# Patient Record
Sex: FEMALE | Race: WHITE | Employment: FULL TIME | URBAN - METROPOLITAN AREA
[De-identification: names, ages, dates, MRNs, and addresses within clinical notes are randomized per-mention and may not be internally consistent; named-entity substitution may affect disease eponyms.]

---

## 2018-05-31 ENCOUNTER — HOSPITAL ENCOUNTER (OUTPATIENT)
Dept: ULTRASOUND IMAGING | Age: 63
Discharge: HOME | End: 2018-05-31
Attending: INTERNAL MEDICINE
Payer: COMMERCIAL

## 2018-05-31 ENCOUNTER — HOSPITAL ENCOUNTER (OUTPATIENT)
Dept: MAMMOGRAPHY | Age: 63
Discharge: HOME | End: 2018-05-31
Attending: INTERNAL MEDICINE
Payer: COMMERCIAL

## 2018-05-31 DIAGNOSIS — N63.10 BREAST MASS, RIGHT: ICD-10-CM

## 2018-05-31 PROCEDURE — 77066 DX MAMMO INCL CAD BI: CPT | Performed by: INTERNAL MEDICINE

## 2018-05-31 PROCEDURE — 77062 BREAST TOMOSYNTHESIS BI: CPT | Performed by: INTERNAL MEDICINE

## 2018-05-31 PROCEDURE — 76642 ULTRASOUND BREAST LIMITED: CPT | Performed by: INTERNAL MEDICINE

## 2019-09-30 PROCEDURE — 88175 CYTOPATH C/V AUTO FLUID REDO: CPT | Performed by: INTERNAL MEDICINE

## 2019-09-30 PROCEDURE — 87624 HPV HI-RISK TYP POOLED RSLT: CPT | Performed by: INTERNAL MEDICINE

## 2020-12-20 ENCOUNTER — IMMUNIZATION (OUTPATIENT)
Dept: LAB | Facility: HOSPITAL | Age: 65
End: 2020-12-20
Attending: PREVENTIVE MEDICINE
Payer: COMMERCIAL

## 2020-12-20 DIAGNOSIS — Z23 NEED FOR VACCINATION: ICD-10-CM

## 2020-12-20 PROCEDURE — 0001A PFIZER-BIONTECH COVID-19 VACCINE: CPT

## 2021-01-10 ENCOUNTER — IMMUNIZATION (OUTPATIENT)
Dept: LAB | Facility: HOSPITAL | Age: 66
End: 2021-01-10
Attending: PREVENTIVE MEDICINE

## 2021-01-10 DIAGNOSIS — Z23 NEED FOR VACCINATION: ICD-10-CM

## 2021-01-10 PROCEDURE — 0002A SARSCOV2 VAC 30MCG/0.3ML IM: CPT

## 2022-05-16 ENCOUNTER — LAB ENCOUNTER (OUTPATIENT)
Dept: LAB | Facility: HOSPITAL | Age: 67
End: 2022-05-16
Payer: COMMERCIAL

## 2022-05-16 DIAGNOSIS — Z23 NEED FOR VACCINATION: Primary | ICD-10-CM

## 2022-05-16 LAB — SARS-COV-2 RNA RESP QL NAA+PROBE: NOT DETECTED

## 2023-01-23 ENCOUNTER — HOSPITAL ENCOUNTER (OUTPATIENT)
Dept: BONE DENSITY | Age: 68
Discharge: HOME | End: 2023-01-23
Attending: INTERNAL MEDICINE
Payer: MEDICARE

## 2023-01-23 ENCOUNTER — HOSPITAL ENCOUNTER (OUTPATIENT)
Dept: MAMMOGRAPHY | Age: 68
Discharge: HOME | End: 2023-01-23
Attending: INTERNAL MEDICINE
Payer: MEDICARE

## 2023-01-23 DIAGNOSIS — Z12.31 VISIT FOR SCREENING MAMMOGRAM: ICD-10-CM

## 2023-01-23 DIAGNOSIS — Z13.820 SCREENING FOR OSTEOPOROSIS: ICD-10-CM

## 2023-01-23 DIAGNOSIS — Z78.0 ASYMPTOMATIC MENOPAUSAL STATE: ICD-10-CM

## 2023-01-23 PROCEDURE — 77080 DXA BONE DENSITY AXIAL: CPT | Performed by: INTERNAL MEDICINE

## 2024-04-26 ENCOUNTER — LAB ENCOUNTER (OUTPATIENT)
Dept: LAB | Age: 69
End: 2024-04-26
Attending: INTERNAL MEDICINE
Payer: MEDICARE

## 2024-04-26 DIAGNOSIS — R19.4 CHANGE IN BOWEL HABITS: ICD-10-CM

## 2024-04-26 LAB — IGA SERPL-MCNC: 146 MG/DL (ref 70–312)

## 2024-04-26 PROCEDURE — 82784 ASSAY IGA/IGD/IGG/IGM EACH: CPT

## 2024-04-26 PROCEDURE — 36415 COLL VENOUS BLD VENIPUNCTURE: CPT

## 2024-04-26 PROCEDURE — 86364 TISS TRNSGLTMNASE EA IG CLAS: CPT

## 2024-04-30 LAB — TTG IGA SER-ACNC: 0.4 U/ML (ref ?–7)

## 2024-07-08 ENCOUNTER — TELEPHONE (OUTPATIENT)
Dept: SURGERY | Facility: CLINIC | Age: 69
End: 2024-07-08

## 2024-07-08 NOTE — TELEPHONE ENCOUNTER
Patient came in today the office and dropped off imaging will be at checkout when patient return to visit dropped off at radiology.

## 2024-07-22 PROBLEM — Z12.11 SPECIAL SCREENING FOR MALIGNANT NEOPLASM OF COLON: Status: ACTIVE | Noted: 2024-07-22

## 2024-07-22 PROBLEM — R13.10 DYSPHAGIA, UNSPECIFIED: Status: ACTIVE | Noted: 2024-07-22

## 2024-07-22 PROCEDURE — 88305 TISSUE EXAM BY PATHOLOGIST: CPT | Performed by: INTERNAL MEDICINE

## 2024-07-30 ENCOUNTER — OFFICE VISIT (OUTPATIENT)
Dept: SURGERY | Facility: CLINIC | Age: 69
End: 2024-07-30
Payer: MEDICARE

## 2024-07-30 VITALS
DIASTOLIC BLOOD PRESSURE: 72 MMHG | SYSTOLIC BLOOD PRESSURE: 126 MMHG | WEIGHT: 155 LBS | HEART RATE: 61 BPM | BODY MASS INDEX: 25.83 KG/M2 | OXYGEN SATURATION: 97 % | HEIGHT: 65 IN

## 2024-07-30 DIAGNOSIS — D32.9 MENINGIOMA (HCC): Primary | ICD-10-CM

## 2024-07-30 PROCEDURE — 3008F BODY MASS INDEX DOCD: CPT | Performed by: NEUROLOGICAL SURGERY

## 2024-07-30 PROCEDURE — 99203 OFFICE O/P NEW LOW 30 MIN: CPT | Performed by: NEUROLOGICAL SURGERY

## 2024-07-30 PROCEDURE — 1160F RVW MEDS BY RX/DR IN RCRD: CPT | Performed by: NEUROLOGICAL SURGERY

## 2024-07-30 PROCEDURE — 3078F DIAST BP <80 MM HG: CPT | Performed by: NEUROLOGICAL SURGERY

## 2024-07-30 PROCEDURE — 1159F MED LIST DOCD IN RCRD: CPT | Performed by: NEUROLOGICAL SURGERY

## 2024-07-30 PROCEDURE — 3074F SYST BP LT 130 MM HG: CPT | Performed by: NEUROLOGICAL SURGERY

## 2024-07-30 NOTE — PROGRESS NOTES
Neurosurgery Clinic Visit  2024    Claribel Marks PCP:  El Roca MD    1955 MRN [id number]       CHIEF COMPLAINT:  Chief Complaint   Patient presents with    New Patient    Other     Meningioma       HISTORY OF PRESENT ILLNESS:  Claribel Marks is a(n) 68 year old female who presents today in neurosurgical consultation.  She has an incidental finding of a left frontal meningioma.    She actually underwent a workup for loss of her sense of taste and smell.  This predates COVID.  She has no symptoms referable to the meningioma.    REVIEW OF SYSTEMS:  The 12 point checklist was reviewed and was negative except: As noted in HPI    ALLERGIES:  Allergies   Allergen Reactions    Pcns [Penicillins] RASH       MEDICATIONS:  Current Outpatient Medications   Medication Sig Dispense Refill    Cholecalciferol (VITAMIN D3 OR) Take by mouth.      Acetylcarnitine HCl (ACETYL L-CARNITINE OR) Take by mouth.      Vitamin C 500 MG Oral Tab Take 1 tablet (500 mg total) by mouth daily.      VITAMIN E OR       magnesium 250 MG Oral Tab Take 1 tablet (250 mg total) by mouth.      COENZYME Q10 ER OR       collagenase 250 UNIT/GM External Ointment Apply 1 Application topically daily.      acidophilus-pectin Oral Cap Take 1 capsule by mouth daily.      Lutein 10 MG Oral Tab       Turmeric 400 MG Oral Cap       MULTI-VITAMIN OR 1 tablet daily      OMEGA-3 FISH OIL OR 1 tablet daily      CALCIUM OR 1 tablet daily      omeprazole 20 MG Oral Capsule Delayed Release Take one capsule (20 mg total) by mouth once daily, 30 minutes prior to breakfast. (Patient not taking: Reported on 2024) 90 capsule 3    Probiotic Product (PROBIOTIC OR) Take by mouth. (Patient not taking: Reported on 2024)         HISTORY:  Past Medical History:    Abnormal Pap smear    LGSIL/HPV    Bad breath    Chronic rhinitis    Diarrhea, unspecified    Eye disease    Had cataract surgery    Fatigue    Hearing loss    Irregular bowel habits    Frequent  bowel movements    Night sweats    OSTEOPENIA    Osteoporosis    Diagnosed with osteopenia    Painful swallowing    Problems with swallowing    Wears glasses    Weight gain     Past Surgical History:   Procedure Laterality Date    Colonoscopy  2007    Hemorrhoidectomy  20 years ago    Hemorrhoidectomy,int/ext,simple  2000           Family History   Problem Relation Age of Onset    Stroke Mother     Other (Other) Mother         a fib, and Carolkarrie's disease    Psychiatric Son         suicidal heroin overdose.    Other (osteoporosis) Maternal Grandmother     Alcohol and Other Disorders Associated Maternal Grandfather      Claribel  reports that she has never smoked. She has never used smokeless tobacco. She reports that she does not currently use alcohol after a past usage of about 1.0 standard drink of alcohol per week. She reports that she does not use drugs.    PHYSICAL EXAMINATION:  Vital Signs:  /72   Pulse 61   Ht 65\"   Wt 155 lb (70.3 kg)   LMP  (LMP Unknown)   SpO2 97%   BMI 25.79 kg/m²   On examination, the patient is neurologically intact.  She is alert and oriented x 3.  Cranial nerves are intact.  No pronator drift.    REVIEW OF STUDIES:  MRI scan was performed May 31 at an outside facility.  This is a noncontrast study.  There is a left frontal extra-axial lesion measuring approximately 2.7 cm.  There is trace underlying vasogenic edema.  Findings are most consistent with meningioma.      ASSESSMENT AND PLAN:  1.  Left frontal extra-axial lesion, suspected meningioma.  I reviewed the findings with the patient.  I would like to obtain an MRI scan of the brain, with and without contrast.  Given the relative lack of high risk features on this study, this will be done in 1 month.  This will also provide a 3-month interval scan.    All questions were answered.  She voices her understanding and agreement.      Time spent on counseling/coordination of care:  15 Minutes    Total Time spent  with patient:  15 Minutes        7/30/2024  10:56 AM

## 2024-07-30 NOTE — PROGRESS NOTES
New patient with diagnosis of Meningioma is here for consult today, referred by Dr Adrian Harris    Pt experiencing altered taste and smell sensations    Imaging:  --5/31/2024-- MRI Brain @ [facility]-- uploaded

## 2024-07-30 NOTE — PATIENT INSTRUCTIONS
Refill policies:    Allow 2-3 business days for refills; controlled substances may take longer.  Contact your pharmacy at least 5 days prior to running out of medication and have them send an electronic request or submit request through the “request refill” option in your [employer] account.  Refills are not addressed on weekends; covering physicians do not authorize routine medications on weekends.  No narcotics or controlled substances are refilled after noon on Fridays or by on call physicians.  By law, narcotics must be electronically prescribed.  A 30 day supply with no refills is the maximum allowed.  If your prescription is due for a refill, you may be due for a follow up appointment.  To best provide you care, patients receiving routine medications need to be seen at least once a year.  Patients receiving narcotic/controlled substance medications need to be seen at least once every 3 months.  In the event that your preferred pharmacy does not have the requested medication in stock (e.g. Backordered), it is your responsibility to find another pharmacy that has the requested medication available.  We will gladly send a new prescription to that pharmacy at your request.    Scheduling Tests:    If your physician has ordered radiology tests such as MRI or CT scans, please contact Central Scheduling at [phone] right away to schedule the test.  Once scheduled, the [facility] Centralized Referral Team will work with your insurance carrier to obtain pre-certification or prior authorization.  Depending on your insurance carrier, approval may take 3-10 days.  It is highly recommended patients assure they have received an authorization before having a test performed.  If test is done without insurance authorization, patient may be responsible for the entire amount billed.      Precertification and Prior Authorizations:  If your physician has recommended that you have a procedure or additional testing performed the [facility]  Centralized Referral Team will contact your insurance carrier to obtain pre-certification or prior authorization.    You are strongly encouraged to contact your insurance carrier to verify that your procedure/test has been approved and is a COVERED benefit.  Although the [facility] Centralized Referral Team does its due diligence, the insurance carrier gives the disclaimer that \"Although the procedure is authorized, this does not guarantee payment.\"    Ultimately the patient is responsible for payment.   Thank you for your understanding in this matter.  Paperwork Completion:  If you require FMLA or disability paperwork for your recovery, please make sure to either drop it off or have it faxed to our office at [phone]. Be sure the form has your name and date of birth on it.  The form will be faxed to our Forms Department and they will complete it for you.  There is a 25$ fee for all forms that need to be filled out.  Please be aware there is a 10-14 day turnaround time.  You will need to sign a release of information (DIANE) form if your paperwork does not come with one.  You may call the Forms Department with any questions at [phone].  Their fax number is [phone].

## 2024-08-29 ENCOUNTER — HOSPITAL ENCOUNTER (OUTPATIENT)
Dept: MRI IMAGING | Facility: HOSPITAL | Age: 69
Discharge: HOME | End: 2024-08-29
Attending: NEUROLOGICAL SURGERY
Payer: MEDICARE

## 2024-08-29 DIAGNOSIS — D32.9 MENINGIOMA (HCC): ICD-10-CM

## 2024-08-29 PROCEDURE — A9575 INJ GADOTERATE MEGLUMI 0.1ML: HCPCS | Performed by: NEUROLOGICAL SURGERY

## 2024-08-29 PROCEDURE — 70553 MRI BRAIN STEM W/O & W/DYE: CPT | Performed by: NEUROLOGICAL SURGERY

## 2024-08-29 RX ORDER — GADOTERATE MEGLUMINE 376.9 MG/ML
15 INJECTION INTRAVENOUS
Status: COMPLETED | OUTPATIENT
Start: 2024-08-29 | End: 2024-08-29

## 2024-08-29 RX ADMIN — GADOTERATE MEGLUMINE 22 ML: 376.9 INJECTION INTRAVENOUS at 17:14:00

## 2024-09-03 ENCOUNTER — OFFICE VISIT (OUTPATIENT)
Dept: SURGERY | Facility: CLINIC | Age: 69
End: 2024-09-03
Payer: MEDICARE

## 2024-09-03 VITALS
BODY MASS INDEX: 24.99 KG/M2 | HEART RATE: 60 BPM | SYSTOLIC BLOOD PRESSURE: 120 MMHG | HEIGHT: 65 IN | WEIGHT: 150 LBS | DIASTOLIC BLOOD PRESSURE: 72 MMHG

## 2024-09-03 DIAGNOSIS — D32.9 MENINGIOMA (HCC): Primary | ICD-10-CM

## 2024-09-03 PROCEDURE — 99213 OFFICE O/P EST LOW 20 MIN: CPT | Performed by: NEUROLOGICAL SURGERY

## 2024-09-03 PROCEDURE — 1159F MED LIST DOCD IN RCRD: CPT | Performed by: NEUROLOGICAL SURGERY

## 2024-09-03 PROCEDURE — 3078F DIAST BP <80 MM HG: CPT | Performed by: NEUROLOGICAL SURGERY

## 2024-09-03 PROCEDURE — 1160F RVW MEDS BY RX/DR IN RCRD: CPT | Performed by: NEUROLOGICAL SURGERY

## 2024-09-03 PROCEDURE — 3008F BODY MASS INDEX DOCD: CPT | Performed by: NEUROLOGICAL SURGERY

## 2024-09-03 PROCEDURE — 3074F SYST BP LT 130 MM HG: CPT | Performed by: NEUROLOGICAL SURGERY

## 2024-09-03 NOTE — PATIENT INSTRUCTIONS
Refill policies:    Allow 2-3 business days for refills; controlled substances may take longer.  Contact your pharmacy at least 5 days prior to running out of medication and have them send an electronic request or submit request through the “request refill” option in your [employer] account.  Refills are not addressed on weekends; covering physicians do not authorize routine medications on weekends.  No narcotics or controlled substances are refilled after noon on Fridays or by on call physicians.  By law, narcotics must be electronically prescribed.  A 30 day supply with no refills is the maximum allowed.  If your prescription is due for a refill, you may be due for a follow up appointment.  To best provide you care, patients receiving routine medications need to be seen at least once a year.  Patients receiving narcotic/controlled substance medications need to be seen at least once every 3 months.  In the event that your preferred pharmacy does not have the requested medication in stock (e.g. Backordered), it is your responsibility to find another pharmacy that has the requested medication available.  We will gladly send a new prescription to that pharmacy at your request.    Scheduling Tests:    If your physician has ordered radiology tests such as MRI or CT scans, please contact Central Scheduling at [phone] right away to schedule the test.  Once scheduled, the [facility] Centralized Referral Team will work with your insurance carrier to obtain pre-certification or prior authorization.  Depending on your insurance carrier, approval may take 3-10 days.  It is highly recommended patients assure they have received an authorization before having a test performed.  If test is done without insurance authorization, patient may be responsible for the entire amount billed.      Precertification and Prior Authorizations:  If your physician has recommended that you have a procedure or additional testing performed the [facility]  Centralized Referral Team will contact your insurance carrier to obtain pre-certification or prior authorization.    You are strongly encouraged to contact your insurance carrier to verify that your procedure/test has been approved and is a COVERED benefit.  Although the [facility] Centralized Referral Team does its due diligence, the insurance carrier gives the disclaimer that \"Although the procedure is authorized, this does not guarantee payment.\"    Ultimately the patient is responsible for payment.   Thank you for your understanding in this matter.  Paperwork Completion:  If you require FMLA or disability paperwork for your recovery, please make sure to either drop it off or have it faxed to our office at [phone]. Be sure the form has your name and date of birth on it.  The form will be faxed to our Forms Department and they will complete it for you.  There is a 25$ fee for all forms that need to be filled out.  Please be aware there is a 10-14 day turnaround time.  You will need to sign a release of information (DIANE) form if your paperwork does not come with one.  You may call the Forms Department with any questions at [phone].  Their fax number is [phone].

## 2024-09-03 NOTE — PROGRESS NOTES
Established patient:  Reason for follow up:   Discuss MRI results-meningioma   Pt denies any new pain or symptoms     Numeric Rating Scale:         Pain at Present:  0/10       New imaging or testing since your last office visit:    MRI brain DOS 08/29/24

## 2024-09-03 NOTE — PROGRESS NOTES
Neurosurgery Clinic Visit  9/3/2024    Claribel Marks PCP:  El Roca MD    1955 MRN [id number]     HISTORY OF PRESENT ILLNESS:  Claribel Marks is a(n) 68 year old female who presents today in office follow-up.  She has a left frontal meningioma.    She does not have any symptoms related to this.      PHYSICAL EXAMINATION:  Vital Signs:  /72   Pulse 60   Ht 65\"   Wt 150 lb (68 kg)   LMP  (LMP Unknown)   BMI 24.96 kg/m²   She remains neurologically intact on examination.      REVIEW OF STUDIES:    Repeat MRI scan was performed on .  Images personally reviewed, and compared with the May 31 study.  This demonstrates stable appearance of the uniformly enhancing, left extra-axial lesion overlying the frontal lobe.      ASSESSMENT and PLAN:  1.  Meningioma.  Once again, we discussed the option of surgical resection versus continued observation.  We will obtain a new MRI scan in 6 months, and I will see the patient at that point.    Case reviewed at multidisciplinary neuro-oncology tumor board.  This reflects the consensus recommendation at tumor board.         Time spent on counseling/coordination of care:  15 Minutes    Total time spent with patient:  15 Minutes        9/3/2024  10:52 AM     This report was dictated using voice recognition technology.  Errors in syntax or recognition may occur, and should not be construed to change the meaning of a sentence.

## 2025-03-03 ENCOUNTER — HOSPITAL ENCOUNTER (OUTPATIENT)
Dept: MRI IMAGING | Facility: HOSPITAL | Age: 70
Discharge: HOME | End: 2025-03-03
Attending: NEUROLOGICAL SURGERY
Payer: MEDICARE

## 2025-03-03 DIAGNOSIS — D32.9 MENINGIOMA (HCC): ICD-10-CM

## 2025-03-03 PROCEDURE — 70553 MRI BRAIN STEM W/O & W/DYE: CPT | Performed by: NEUROLOGICAL SURGERY

## 2025-03-03 PROCEDURE — A9575 INJ GADOTERATE MEGLUMI 0.1ML: HCPCS | Performed by: NEUROLOGICAL SURGERY

## 2025-03-03 RX ORDER — GADOTERATE MEGLUMINE 376.9 MG/ML
15 INJECTION INTRAVENOUS
Status: COMPLETED | OUTPATIENT
Start: 2025-03-03 | End: 2025-03-03

## 2025-03-03 RX ADMIN — GADOTERATE MEGLUMINE 15 ML: 376.9 INJECTION INTRAVENOUS at 14:59:00

## 2025-03-03 RX ADMIN — GADOTERATE MEGLUMINE 12 ML: 376.9 INJECTION INTRAVENOUS at 15:00:00

## 2025-03-04 ENCOUNTER — OFFICE VISIT (OUTPATIENT)
Dept: SURGERY | Facility: CLINIC | Age: 70
End: 2025-03-04
Payer: MEDICARE

## 2025-03-04 VITALS
HEART RATE: 60 BPM | OXYGEN SATURATION: 96 % | BODY MASS INDEX: 26.99 KG/M2 | WEIGHT: 162 LBS | DIASTOLIC BLOOD PRESSURE: 60 MMHG | SYSTOLIC BLOOD PRESSURE: 102 MMHG | HEIGHT: 65 IN

## 2025-03-04 DIAGNOSIS — D32.9 MENINGIOMA (HCC): Primary | ICD-10-CM

## 2025-03-04 PROCEDURE — 3074F SYST BP LT 130 MM HG: CPT | Performed by: NEUROLOGICAL SURGERY

## 2025-03-04 PROCEDURE — 3078F DIAST BP <80 MM HG: CPT | Performed by: NEUROLOGICAL SURGERY

## 2025-03-04 PROCEDURE — 99213 OFFICE O/P EST LOW 20 MIN: CPT | Performed by: NEUROLOGICAL SURGERY

## 2025-03-04 PROCEDURE — 1159F MED LIST DOCD IN RCRD: CPT | Performed by: NEUROLOGICAL SURGERY

## 2025-03-04 PROCEDURE — 1160F RVW MEDS BY RX/DR IN RCRD: CPT | Performed by: NEUROLOGICAL SURGERY

## 2025-03-04 PROCEDURE — 3008F BODY MASS INDEX DOCD: CPT | Performed by: NEUROLOGICAL SURGERY

## 2025-03-04 NOTE — PROGRESS NOTES
Patient is here today for follow up regarding Meningioma- review of surveillance imaging    IMAGING:  --3/3/2025 MRI Brain

## 2025-03-04 NOTE — PROGRESS NOTES
Neurosurgery Clinic Visit  3/4/2025    Clarible Marks PCP:  El Roca MD    1955 MRN [id number]     HISTORY OF PRESENT ILLNESS:  Claribel Marks is a(n) 69 year old female who presents today in office follow-up.  She has no new concerns.  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      PHYSICAL EXAMINATION:  Vital Signs:  /60   Pulse 60   Ht 65\"   Wt 162 lb (73.5 kg)   LMP  (LMP Unknown)   SpO2 96%   BMI 26.96 kg/m²   On examination, the patient is neurologically intact.      REVIEW OF STUDIES:    Repeat MRI scan was performed yesterday.  I compared this with the August and May 2024 studies.  The left frontal meningioma appears stable in size across all of the studies.      ASSESSMENT and PLAN:  1.  Left frontal meningioma.  This appears stable over the past 10 months.  Repeat MRI scan in 6 months.  We discussed the potential risk of this growing, and available treatment options.  We agreed that continued observation is very reasonable at this point.        Time spent on counseling/coordination of care:  15 Minutes    Total time spent with patient:  15 Minutes        3/4/2025  3:57 PM     This report was dictated using voice recognition technology.  Errors in syntax or recognition may occur, and should not be construed to change the meaning of a sentence.

## 2025-03-04 NOTE — PATIENT INSTRUCTIONS
Refill policies:    Allow 2-3 business days for refills; controlled substances may take longer.  Contact your pharmacy at least 5 days prior to running out of medication and have them send an electronic request or submit request through the “request refill” option in your [employer] account.  Refills are not addressed on weekends; covering physicians do not authorize routine medications on weekends.  No narcotics or controlled substances are refilled after noon on Fridays or by on call physicians.  By law, narcotics must be electronically prescribed.  A 30 day supply with no refills is the maximum allowed.  If your prescription is due for a refill, you may be due for a follow up appointment.  To best provide you care, patients receiving routine medications need to be seen at least once a year.  Patients receiving narcotic/controlled substance medications need to be seen at least once every 3 months.  In the event that your preferred pharmacy does not have the requested medication in stock (e.g. Backordered), it is your responsibility to find another pharmacy that has the requested medication available.  We will gladly send a new prescription to that pharmacy at your request.    Scheduling Tests:    If your physician has ordered radiology tests such as MRI or CT scans, please contact Central Scheduling at [phone] right away to schedule the test.  Once scheduled, the [facility] Centralized Referral Team will work with your insurance carrier to obtain pre-certification or prior authorization.  Depending on your insurance carrier, approval may take 3-10 days.  It is highly recommended patients assure they have received an authorization before having a test performed.  If test is done without insurance authorization, patient may be responsible for the entire amount billed.      Precertification and Prior Authorizations:  If your physician has recommended that you have a procedure or additional testing performed the [facility]  Centralized Referral Team will contact your insurance carrier to obtain pre-certification or prior authorization.    You are strongly encouraged to contact your insurance carrier to verify that your procedure/test has been approved and is a COVERED benefit.  Although the [facility] Centralized Referral Team does its due diligence, the insurance carrier gives the disclaimer that \"Although the procedure is authorized, this does not guarantee payment.\"    Ultimately the patient is responsible for payment.   Thank you for your understanding in this matter.  Paperwork Completion:  If you require FMLA or disability paperwork for your recovery, please make sure to either drop it off or have it faxed to our office at [phone]. Be sure the form has your name and date of birth on it.  The form will be faxed to our Forms Department and they will complete it for you.  There is a 25$ fee for all forms that need to be filled out.  Please be aware there is a 10-14 day turnaround time.  You will need to sign a release of information (DIANE) form if your paperwork does not come with one.  You may call the Forms Department with any questions at [phone].  Their fax number is [phone].